# Patient Record
Sex: FEMALE | Race: WHITE | NOT HISPANIC OR LATINO | Employment: FULL TIME | URBAN - METROPOLITAN AREA
[De-identification: names, ages, dates, MRNs, and addresses within clinical notes are randomized per-mention and may not be internally consistent; named-entity substitution may affect disease eponyms.]

---

## 2017-02-17 ENCOUNTER — HOSPITAL ENCOUNTER (OUTPATIENT)
Dept: RADIOLOGY | Age: 47
Discharge: HOME | End: 2017-02-17
Payer: COMMERCIAL

## 2017-02-17 DIAGNOSIS — Z12.31 ENCOUNTER FOR SCREENING MAMMOGRAM FOR MALIGNANT NEOPLASM OF BREAST: ICD-10-CM

## 2017-02-17 PROCEDURE — G0202 SCR MAMMO BI INCL CAD: HCPCS

## 2018-01-18 NOTE — MISCELLANEOUS
Message  12/08/2016 - 18:24 - called by pharmacist - concerned about possible interaction between Cipro and Cymbalta; will give Bactrim DS by mouth twice a day Ã 5 days (#10)      Plan  Abdominal pain    · (1) URINE CULTURE; Source:Urine, Clean Catch; Status:Active;  Requested  for:[id number];    · Urine Dip Non-Automated- POC; Status:Complete;   Done: [id number] 05:55PM   · Urine HCG- POC; Status:Complete;   Done: [id number] 05:58PM  Dysuria    · Ciprofloxacin HCl - 500 MG Oral Tablet (Cipro); TAKE 1 TABLET TWICE DAILY    Signatures   Electronically signed by : Garry Pan DO; Dec  8 2016  6:29PM EST                       (Author)

## 2018-02-13 ENCOUNTER — TRANSCRIBE ORDERS (OUTPATIENT)
Dept: RADIOLOGY | Facility: CLINIC | Age: 48
End: 2018-02-13

## 2018-02-14 DIAGNOSIS — Z12.31 ENCOUNTER FOR SCREENING MAMMOGRAM FOR MALIGNANT NEOPLASM OF BREAST: Primary | ICD-10-CM
